# Patient Record
Sex: MALE | Race: WHITE | NOT HISPANIC OR LATINO | Employment: OTHER | URBAN - METROPOLITAN AREA
[De-identification: names, ages, dates, MRNs, and addresses within clinical notes are randomized per-mention and may not be internally consistent; named-entity substitution may affect disease eponyms.]

---

## 2024-07-06 ENCOUNTER — HOSPITAL ENCOUNTER (EMERGENCY)
Facility: HOSPITAL | Age: 29
Discharge: HOME | End: 2024-07-06
Attending: EMERGENCY MEDICINE
Payer: OTHER GOVERNMENT

## 2024-07-06 ENCOUNTER — APPOINTMENT (OUTPATIENT)
Dept: RADIOLOGY | Facility: HOSPITAL | Age: 29
End: 2024-07-06
Payer: OTHER GOVERNMENT

## 2024-07-06 VITALS
HEIGHT: 68 IN | TEMPERATURE: 97.7 F | SYSTOLIC BLOOD PRESSURE: 127 MMHG | BODY MASS INDEX: 27.28 KG/M2 | DIASTOLIC BLOOD PRESSURE: 68 MMHG | HEART RATE: 74 BPM | WEIGHT: 180 LBS | RESPIRATION RATE: 18 BRPM | OXYGEN SATURATION: 95 %

## 2024-07-06 DIAGNOSIS — S49.92XA INJURY OF LEFT SHOULDER, INITIAL ENCOUNTER: Primary | ICD-10-CM

## 2024-07-06 PROCEDURE — 99284 EMERGENCY DEPT VISIT MOD MDM: CPT | Performed by: EMERGENCY MEDICINE

## 2024-07-06 PROCEDURE — 99283 EMERGENCY DEPT VISIT LOW MDM: CPT | Performed by: EMERGENCY MEDICINE

## 2024-07-06 PROCEDURE — 73030 X-RAY EXAM OF SHOULDER: CPT | Mod: LT

## 2024-07-06 PROCEDURE — 73030 X-RAY EXAM OF SHOULDER: CPT | Mod: LEFT SIDE | Performed by: RADIOLOGY

## 2024-07-06 ASSESSMENT — LIFESTYLE VARIABLES
HAVE YOU EVER FELT YOU SHOULD CUT DOWN ON YOUR DRINKING: NO
HAVE PEOPLE ANNOYED YOU BY CRITICIZING YOUR DRINKING: NO
TOTAL SCORE: 0
EVER HAD A DRINK FIRST THING IN THE MORNING TO STEADY YOUR NERVES TO GET RID OF A HANGOVER: NO
EVER FELT BAD OR GUILTY ABOUT YOUR DRINKING: NO

## 2024-07-06 ASSESSMENT — COLUMBIA-SUICIDE SEVERITY RATING SCALE - C-SSRS
2. HAVE YOU ACTUALLY HAD ANY THOUGHTS OF KILLING YOURSELF?: NO
1. IN THE PAST MONTH, HAVE YOU WISHED YOU WERE DEAD OR WISHED YOU COULD GO TO SLEEP AND NOT WAKE UP?: NO
6. HAVE YOU EVER DONE ANYTHING, STARTED TO DO ANYTHING, OR PREPARED TO DO ANYTHING TO END YOUR LIFE?: NO

## 2024-07-06 ASSESSMENT — PAIN DESCRIPTION - PAIN TYPE: TYPE: ACUTE PAIN

## 2024-07-06 ASSESSMENT — PAIN DESCRIPTION - ORIENTATION: ORIENTATION: LEFT

## 2024-07-06 ASSESSMENT — PAIN - FUNCTIONAL ASSESSMENT: PAIN_FUNCTIONAL_ASSESSMENT: 0-10

## 2024-07-06 ASSESSMENT — PAIN DESCRIPTION - DESCRIPTORS: DESCRIPTORS: ACHING

## 2024-07-06 ASSESSMENT — PAIN SCALES - GENERAL: PAINLEVEL_OUTOF10: 8

## 2024-07-06 ASSESSMENT — PAIN DESCRIPTION - FREQUENCY: FREQUENCY: CONSTANT/CONTINUOUS

## 2024-07-06 ASSESSMENT — PAIN DESCRIPTION - LOCATION: LOCATION: SHOULDER

## 2024-07-06 NOTE — ED PROVIDER NOTES
EMERGENCY DEPARTMENT ENCOUNTER      Pt Name: Feroz Jolley  MRN: [id number]  Birthdate 1995  Date of evaluation: 7/6/2024  Provider: Wilbur Vasquez MD    CHIEF COMPLAINT       Chief Complaint   Patient presents with    Shoulder Injury     Fell onto left shoulder while playing with his kids. Radial pulse intact in triage     HISTORY OF PRESENT ILLNESS    HPI  28-year-old male presents emergency department with chief complaint of shoulder injury.  Patient indicates that he was playing with his children tripped and fell did not hit his head did not lose consciousness but landed on his left shoulder.  He denies any previous shoulder dislocations.  He denies feeling any pain or popping sensation in the shoulder joint immediately after the situation.  Nursing Notes were reviewed.    PAST MEDICAL HISTORY     Past Medical History:   Diagnosis Date    Myopia, unspecified eye 04/19/2016    Axial myopia         SURGICAL HISTORY     No past surgical history on file.      CURRENT MEDICATIONS       Previous Medications    No medications on file       ALLERGIES     Amoxicillin    FAMILY HISTORY     No family history on file.       SOCIAL HISTORY       Social History     Socioeconomic History    Marital status: Unknown     Spouse name: Not on file    Number of children: Not on file    Years of education: Not on file    Highest education level: Not on file   Occupational History    Not on file   Tobacco Use    Smoking status: Not on file    Smokeless tobacco: Not on file   Substance and Sexual Activity    Alcohol use: Not on file    Drug use: Not on file    Sexual activity: Not on file   Other Topics Concern    Not on file   Social History Narrative    Not on file     Social Determinants of Health     Financial Resource Strain: Not on file   Food Insecurity: Not on file   Transportation Needs: Not on file   Physical Activity: Not on file   Stress: Not on file   Social Connections: Not on file   Intimate Partner Violence: Not  on file   Housing Stability: Not on file       SCREENINGS                        PHYSICAL EXAM    (up to 7 for level 4, 8 or more for level 5)     ED Triage Vitals [07/06/24 1904]   Temperature Heart Rate Respirations BP   36.5 °C (97.7 °F) 84 18 127/58      Pulse Ox Temp Source Heart Rate Source Patient Position   99 % Tympanic Monitor Sitting      BP Location FiO2 (%)     Right arm --       Physical Exam  Vitals and nursing note reviewed.   Constitutional:       General: He is not in acute distress.     Appearance: He is well-developed.   HENT:      Head: Normocephalic and atraumatic.   Eyes:      Conjunctiva/sclera: Conjunctivae normal.   Cardiovascular:      Rate and Rhythm: Normal rate and regular rhythm.      Heart sounds: No murmur heard.  Pulmonary:      Effort: Pulmonary effort is normal. No respiratory distress.      Breath sounds: Normal breath sounds.   Abdominal:      Palpations: Abdomen is soft.      Tenderness: There is no abdominal tenderness.   Musculoskeletal:         General: No swelling.      Right shoulder: Normal.      Left shoulder: Decreased range of motion. Decreased strength.      Cervical back: Neck supple.   Skin:     General: Skin is warm and dry.      Capillary Refill: Capillary refill takes less than 2 seconds.   Neurological:      Mental Status: He is alert.   Psychiatric:         Mood and Affect: Mood normal.          DIAGNOSTIC RESULTS     LABS:  Labs Reviewed - No data to display    All other labs were within normal range or not returned as of this dictation.    Imaging  XR shoulder left 2+ views    (Results Pending)        Procedures  Procedures     EMERGENCY DEPARTMENT COURSE/MDM:   Medical Decision Making  28-year-old male presents emergency department with chief complaint of shoulder injury.  Medical management treatment emergency department will consist of x-ray of the left shoulder.  X-ray shows a partial separation but no dislocation or fracture.  Patient will be discharged  home with instructions to follow-up with orthopedics he also be given a sling and informed to ice ambulate as tolerated.        Diagnoses as of 07/06/24 2002   Injury of left shoulder, initial encounter        Patient and or family in agreement and understanding of treatment plan.  All questions answered.      I reviewed the case with the attending ED physician. The attending ED physician agrees with the plan. Patient and/or patient´s representative was counseled regarding labs, imaging, likely diagnosis, and plan. All questions were answered.    ED Medications administered this visit:  Medications - No data to display    New Prescriptions from this visit:    New Prescriptions    No medications on file       Follow-up:  No follow-up provider specified.      Final Impression: No diagnosis found.      (Please note that portions of this note were completed with a voice recognition program.  Efforts were made to edit the dictations but occasionally words are mis-transcribed.)     Wilbur Vasquez MD  Resident  07/06/24 2003

## 2024-07-07 NOTE — DISCHARGE INSTRUCTIONS
You are seen the emergency department today after you sustained an injury to your left shoulder.  X-ray may show a partial separation.  We have included instructions for follow-up with orthopedic surgery please follow-up with them for evaluation and continued treatment.  Please follow-up with your primary care provider within the next week for evaluation as well.  If you develop any new pain numbness in your fingers or arm please return back to the emergency department soon as possible.  Please continue to ice and ambulate the arm as tolerated.

## 2024-07-10 ENCOUNTER — APPOINTMENT (OUTPATIENT)
Dept: ORTHOPEDIC SURGERY | Facility: CLINIC | Age: 29
End: 2024-07-10
Payer: OTHER GOVERNMENT

## 2024-07-10 DIAGNOSIS — S43.102A AC SEPARATION, LEFT, INITIAL ENCOUNTER: Primary | ICD-10-CM

## 2024-07-10 DIAGNOSIS — S49.92XA INJURY OF LEFT SHOULDER, INITIAL ENCOUNTER: ICD-10-CM

## 2024-07-10 PROCEDURE — 99203 OFFICE O/P NEW LOW 30 MIN: CPT | Performed by: FAMILY MEDICINE

## 2024-07-10 PROCEDURE — 1036F TOBACCO NON-USER: CPT | Performed by: FAMILY MEDICINE

## 2024-07-10 NOTE — PROGRESS NOTES
History of Present Illness   Chief Complaint   Patient presents with    OTHER     LT SHOULDER PAIN FOR 4 DAYS  PATIENT FELL WHILE PLAYING IN YARD WITH CHILDREN       The patient is 28 y.o. right-hand-dominant male  here with a complaint of left shoulder pain.  Acute onset of symptoms 4 days ago, was playing with his nephew when he slipped on some grass and landed on his left shoulder with acute onset of pain, limited range of motion.  He was seen in the emergency department following injury, x-rays obtained which revealed a grade 2 AC separation, he is given a sling and recommended outpatient orthopedic follow-up.  He does admit to some mild improvement in symptoms but still having discomfort, limited range of motion at the shoulder.  Pain is most prominent at the AC joint, he denies any radiation of pain, no numbness or tingling, no shoulder problems in the past.  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 Sig Documenting Provider Last Dose Status            No Medications to Display                                   Allergies   Allergen Reactions    Amoxicillin Anaphylaxis       Social History     Socioeconomic History    Marital status: Single     Spouse name: Not on file    Number of children: Not on file    Years of education: Not on file    Highest education level: Not on file   Occupational History    Not on file   Tobacco Use    Smoking status: Not on file    Smokeless tobacco: Not on file   Substance and Sexual Activity    Alcohol use: Not on file    Drug use: Not on file    Sexual activity: Not on file   Other Topics Concern    Not on file   Social History Narrative    Not on file     Social Determinants of Health      Financial Resource Strain: Not on file   Food Insecurity: Not on file   Transportation Needs: Not on file   Physical Activity: Not on file   Stress: Not on file   Social Connections: Not on file   Intimate Partner Violence: Not on file   Housing Stability: Not on file       No past surgical history on file.       Review of Systems   GENERAL: Negative  GI: Negative  MUSCULOSKELETAL: See HPI  SKIN: Negative  NEURO:  Negative     Physical Exam:    General/Constitutional: well appearing, no distress, appears stated age  HEENT: sclera clear  Respiratory: non labored breathing  Vascular: No edema, swelling or tenderness, except as noted in detailed exam.  Integumentary: No impressive skin lesions present, except as noted in detailed exam.  Neurological:  Alert and oriented   Psychological:  Normal mood and affect.  Musculoskeletal: Normal, except as noted in detailed exam and in HPI    Left shoulder: There is some ecchymosis over the superior aspect of the shoulder, there is mild prominence of the distal clavicle, there is no tenting of the skin.  He is tender palpation of the distal clavicle and AC joint, there is some mild instability to palpation here.  No other areas of tenderness palpation.  He has limited range of motion, forward flexion 25 degrees, similar with abduction, internal rotation to left side.  Strength testing was deferred today in setting of acute injury, left upper extremity is neurovascular intact       Imaging: X-rays of left shoulder from 7/6/2024 dependently reviewed, there is evidence of a grade 2 AC separation with elevation of the distal clavicle in relation to the acromion      Assessment   1. AC separation, left, initial encounter        2. Injury of left shoulder, initial encounter  Referral to Orthopaedic Surgery            Plan: Discussed diagnosis, reviewed x-rays, further workup and treatment.  We discussed that these are typically treated conservatively/nonoperatively, I do recommend  that he continue with sling immobilization for comfort for the next 2 weeks or so.  He will continue with ibuprofen as needed for pain control, he can come out of the sling to do some pendulum exercises, gentle light range of motion exercises as tolerated.  He will follow-up in 2 weeks for reassessment.  Would likely plan to initiate some physical therapy at that time.

## 2024-07-31 ENCOUNTER — APPOINTMENT (OUTPATIENT)
Dept: ORTHOPEDIC SURGERY | Facility: CLINIC | Age: 29
End: 2024-07-31
Payer: OTHER GOVERNMENT

## 2024-07-31 DIAGNOSIS — S43.102D AC SEPARATION, TYPE 2, LEFT, SUBSEQUENT ENCOUNTER: Primary | ICD-10-CM

## 2024-07-31 PROCEDURE — 1036F TOBACCO NON-USER: CPT | Performed by: FAMILY MEDICINE

## 2024-07-31 PROCEDURE — 99213 OFFICE O/P EST LOW 20 MIN: CPT | Performed by: FAMILY MEDICINE

## 2024-07-31 NOTE — PROGRESS NOTES
History of Present Illness   Chief Complaint   Patient presents with    Left Shoulder - Follow-up       The patient is 28 y.o. right-hand-dominant male  here for follow-up of left shoulder AC joint sprain.  Patient is a little over 3 weeks out from injury after a fall playing with his nephew.  Treatment has been conservative with sling immobilization, did encourage some early active range of motion exercises.  He does admit to good improvement in symptoms since her initial visit.  He says that pain is decreasing, range of motion is getting better, closer to his baseline.  He denies any new injuries or symptoms.  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        Past Medical History:   Diagnosis Date    Myopia, unspecified eye 04/19/2016    Axial myopia       Medication Documentation Review Audit       Reviewed by Luther Funk MD (Physician) on 07/10/24 at 1634      Medication Order Taking? Sig Documenting Provider Last Dose Status            No Medications to Display                                   Allergies   Allergen Reactions    Amoxicillin Anaphylaxis       Social History     Socioeconomic History    Marital status: Single     Spouse name: Not on file    Number of children: Not on file    Years of education: Not on file    Highest education level: Not on file   Occupational History    Not on file   Tobacco Use    Smoking status: Never    Smokeless tobacco: Never   Substance and Sexual Activity    Alcohol use: Not on file    Drug use: Not on file    Sexual activity: Not on file   Other Topics Concern    Not on file   Social History Narrative    Not on file     Social Determinants of Health     Financial Resource Strain: Not on file   Food Insecurity: Not on file   Transportation Needs: Not on file   Physical Activity: Not on file   Stress: Not on file   Social Connections: Not on file   Intimate Partner Violence: Not on file   Housing Stability:  Not on file       No past surgical history on file.       Review of Systems   GENERAL: Negative  GI: Negative  MUSCULOSKELETAL: See HPI  SKIN: Negative  NEURO:  Negative     Physical Exam:    General/Constitutional: well appearing, no distress, appears stated age  HEENT: sclera clear  Respiratory: non labored breathing  Vascular: No edema, swelling or tenderness, except as noted in detailed exam.  Integumentary: No impressive skin lesions present, except as noted in detailed exam.  Neurological:  Alert and oriented   Psychological:  Normal mood and affect.  Musculoskeletal: Normal, except as noted in detailed exam and in HPI    Left shoulder: Mild residual prominence of the distal clavicle and relation to the acromion otherwise normal appearance.  He remains tender to palpation at the AC joint and distal clavicle.  No significant instability on exam today.  Range of motion is improved, he has forward flexion and abduction to 150 degrees improved from 25 degrees at initial visit with pain at endrange.  Internal rotation to lumbar spine.  There is no significant weakness with rotator cuff strength testing today, mild pain with resisted abduction.  He does have pain with crossarm adduction and Ralston testing today.      Imaging: No new imaging today    Assessment   1. AC separation, type 2, left, subsequent encounter  Referral to Physical Therapy        A little over 3 weeks out from injury, doing well, very mild residual range of motion deficit, still having some pain      Plan: Recommending continued conservative management, I did explain that he would likely benefit from some physical therapy to help with his residual pain, mildly decreased range of motion, dissipate improvement in residual symptoms over the next 3 to 4 weeks.  He can return back to full activity as tolerated by symptoms after this at discretion of himself and physical therapist.  He will plan to follow-up with me as symptoms dictate